# Patient Record
Sex: MALE | Race: OTHER | HISPANIC OR LATINO | URBAN - METROPOLITAN AREA
[De-identification: names, ages, dates, MRNs, and addresses within clinical notes are randomized per-mention and may not be internally consistent; named-entity substitution may affect disease eponyms.]

---

## 2020-07-08 ENCOUNTER — EMERGENCY (EMERGENCY)
Facility: HOSPITAL | Age: 50
LOS: 0 days | Discharge: HOME | End: 2020-07-08
Attending: EMERGENCY MEDICINE | Admitting: EMERGENCY MEDICINE
Payer: COMMERCIAL

## 2020-07-08 VITALS
OXYGEN SATURATION: 97 % | WEIGHT: 205.03 LBS | DIASTOLIC BLOOD PRESSURE: 86 MMHG | TEMPERATURE: 98 F | HEART RATE: 75 BPM | RESPIRATION RATE: 18 BRPM | SYSTOLIC BLOOD PRESSURE: 134 MMHG

## 2020-07-08 DIAGNOSIS — K08.89 OTHER SPECIFIED DISORDERS OF TEETH AND SUPPORTING STRUCTURES: ICD-10-CM

## 2020-07-08 DIAGNOSIS — Z88.1 ALLERGY STATUS TO OTHER ANTIBIOTIC AGENTS STATUS: ICD-10-CM

## 2020-07-08 DIAGNOSIS — K02.9 DENTAL CARIES, UNSPECIFIED: ICD-10-CM

## 2020-07-08 PROCEDURE — 99282 EMERGENCY DEPT VISIT SF MDM: CPT

## 2020-07-08 NOTE — CONSULT NOTE ADULT - SUBJECTIVE AND OBJECTIVE BOX
Patient is a 50y old  Male who presents with a chief complaint of  pain in upper right tooth.    HPI: Patient stated that he has been in pain for the past 3 days. He stated that he has hot and cold sensitivity and has not been able to eat well.      PAST MEDICAL & SURGICAL HISTORY:    (-   ) heart valve replacement  ( -  ) joint replacement    MEDICATIONS  (STANDING): none    MEDICATIONS  (PRN): patient instructed to take over the counter pain medication PRN as directed.    Allergies: Bactrim (Rash)      *SOCIAL HISTORY: ( -  ) Tobacco; (  - ) ETOH    Vital Signs Last 24 Hrs  T(C): 36.7 (08 Jul 2020 12:43), Max: 36.7 (08 Jul 2020 12:43)  T(F): 98 (08 Jul 2020 12:43), Max: 98 (08 Jul 2020 12:43)  HR: 75 (08 Jul 2020 12:43) (75 - 75)  BP: 134/86 (08 Jul 2020 12:43) (134/86 - 134/86)  BP(mean): --  RR: 18 (08 Jul 2020 12:43) (18 - 18)  SpO2: 97% (08 Jul 2020 12:43) (97% - 97%)    Last Dental Visit: unknown    EOE:  TMJ ( -  ) clicks                     (  - ) pops                     ( - ) crepitus             Mandible, Facial bones and MOM grossly intact          	   ( -  ) trismus             ( -  ) lymphadenopathy             ( -  ) swelling             ( -  ) asymmetry             (  - ) palpation             ( -  ) dyspnea             ( -  ) dysphagia             ( -  ) loss of consciousness    IOE:  permanent dentition: multiple carious teeth                hard/soft palate:  ( -  ) palatal torus, <<No pathology noted>>            tongue/FOM <<No pathology noted>>            labial/buccal mucosa <<No pathology noted>>           (  +  ) percussion           (  - ) palpation           (  - ) swelling            (  - ) abscess           (  - ) sinus tract    *DENTAL RADIOGRAPHS: 1 pre operative periapical taken of tooth #13 and 1 post operative periapical taken of tooth #13    *ASSESSMENT: Patient sensitive to percussion on tooth #13. Patient did not experience sensitivity to percussion on teeth #12 and #14 although these teeth do have carious lesions.     *PLAN: extraction of tooth #13, patient instructed to follow up with outside dentist to receive comprehensive care    PROCEDURE:   Verbal and written consent given.  Anesthesia: 2 carpules of 2% lidocaine with 1:100,000 epinephrine and 1 carpule of 4% septocaine with 1:100,000 epinephrine given via infiltration, palatal, and intrapulpal  Treatment: Soft tissue elevated from tooth #13 with periosteal elevator. Tooth mobilized with straight elevator. During delivery attempt with upper universal forcep, crown of tooth #13 fractured off. Tooth was sectioned mesial-distally. Flap was elevated from tooth #12 to tooth #14 using a 15 blade. Tooth was delivered using upper universal forcep.    RECOMMENDATIONS:  1) Visit outside dentist to receive comprehensive care and treat multiple carious teeth.  2) Dental F/U with outpatient dentist for comprehensive dental care.   3) If any difficulty swallowing/breathing, fever occur, return to ER.     Resident Name, pager # Patient is a 50y old  Male who presents with a chief complaint of  pain in upper right tooth.    HPI: Patient stated that he has been in pain for the past 3 days. He stated that he has hot and cold sensitivity and has not been able to eat well.      PAST MEDICAL & SURGICAL HISTORY:    (-   ) heart valve replacement  ( -  ) joint replacement    MEDICATIONS  (STANDING): none    MEDICATIONS  (PRN): patient instructed to take over the counter pain medication PRN as directed.    Allergies: Bactrim (Rash)      *SOCIAL HISTORY: ( -  ) Tobacco; (  - ) ETOH    Vital Signs Last 24 Hrs  T(C): 36.7 (08 Jul 2020 12:43), Max: 36.7 (08 Jul 2020 12:43)  T(F): 98 (08 Jul 2020 12:43), Max: 98 (08 Jul 2020 12:43)  HR: 75 (08 Jul 2020 12:43) (75 - 75)  BP: 134/86 (08 Jul 2020 12:43) (134/86 - 134/86)  BP(mean): --  RR: 18 (08 Jul 2020 12:43) (18 - 18)  SpO2: 97% (08 Jul 2020 12:43) (97% - 97%)    Last Dental Visit: unknown    EOE:  TMJ ( -  ) clicks                     (  - ) pops                     ( - ) crepitus             Mandible, Facial bones and MOM grossly intact          	   ( -  ) trismus             ( -  ) lymphadenopathy             ( -  ) swelling             ( -  ) asymmetry             (  - ) palpation             ( -  ) dyspnea             ( -  ) dysphagia             ( -  ) loss of consciousness    IOE:  permanent dentition: multiple carious teeth                hard/soft palate:  ( -  ) palatal torus, <<No pathology noted>>            tongue/FOM <<No pathology noted>>            labial/buccal mucosa <<No pathology noted>>           (  +  ) percussion           (  - ) palpation           (  - ) swelling            (  - ) abscess           (  - ) sinus tract    *DENTAL RADIOGRAPHS: 1 pre operative periapical taken of tooth #13 and 1 post operative periapical taken of tooth #13    *ASSESSMENT: Patient sensitive to percussion on tooth #13. Patient did not experience sensitivity to percussion on teeth #12 and #14 although these teeth do have carious lesions.     *PLAN: extraction of tooth #13, patient instructed to follow up with outside dentist to receive comprehensive care    PROCEDURE:   Verbal and written consent given.  Anesthesia: 2 carpules of 2% lidocaine with 1:100,000 epinephrine and 1 carpule of 4% septocaine with 1:100,000 epinephrine given via infiltration, palatal, and intrapulpal  Treatment: Soft tissue elevated from tooth #13 with periosteal elevator. Tooth mobilized with straight elevator. During delivery attempt with upper universal forcep, crown of tooth #13 fractured off. Tooth was sectioned mesial-distally. Flap was elevated from tooth #12 to tooth #14 using a 15 blade. Tooth was delivered using upper universal forcep. Two resorbable sutures placed in surgical site. Post-operative radiograph taken. Post-operative instructions given verbally and written. Hemostasis obtained. No complications.    RECOMMENDATIONS:  1) Visit outside dentist to receive comprehensive care and treat multiple carious teeth.  2) Dental F/U with outpatient dentist for comprehensive dental care.   3) If any difficulty swallowing/breathing, fever occur, return to ER.     Resident Name, pager #

## 2020-07-08 NOTE — ED PROVIDER NOTE - PHYSICAL EXAMINATION
CONST: Well appearing in NAD  EYES: PERRL, EOMI, Sclera and conjunctiva clear.   ENT: Oropharynx L upper dental carry. No abscess or swelling. Uvula midline. No nasal discharge.   NECK: Non-tender, no meningeal signs. normal ROM. supple   CARD: S1 S2; No jvd  RESP: Equal BS B/L, No wheezes, rhonchi or rales. No distress  GI: Soft, non-tender, non-distended. normal BS  MS: Normal ROM in all extremities. pulses 2 +. no calf tenderness or swelling  SKIN: Warm, dry, no acute rashes. Good turgor  NEURO: A&Ox3

## 2020-07-08 NOTE — ED PROVIDER NOTE - CLINICAL SUMMARY MEDICAL DECISION MAKING FREE TEXT BOX
I have fully discussed the medical management and delivery of care with the patient / guardian. I have discussed any available labs, imaging and treatment options with the patient / guardian and any diagnostic results supporting the patient's diagnosis. Please see progress notes, attending note and above notations for further mdm. Chart completed. I have discussed the medical management and delivery of care with the patient / guardian. I have discussed any available labs, imaging and treatment options with the patient / guardian and any diagnostic results supporting the patient's diagnosis. Please see progress notes, attending note and above notations for further mdm. Chart completed.

## 2020-07-08 NOTE — ED PROVIDER NOTE - OBJECTIVE STATEMENT
50y M no ppmh presents for eval of dental pain. Pt has 3 days of moderate sharp pain localized to L upper molar, minimal relief with oraljel, aggravated by eating. Denies fever, dc, redness, numbness

## 2020-07-08 NOTE — ED PROVIDER NOTE - NS ED ROS FT
Constitutional: (-) fever  Eyes/ENT: (+) dental pain, (-) blurry vision, (-) epistaxis  Cardiovascular: (-) chest pain, (-) syncope  Respiratory: (-) cough, (-) shortness of breath  Gastrointestinal: (-) vomiting, (-) diarrhea  : (-) dysuria, (-) hematuria  Musculoskeletal: (-) neck pain, (-) back pain, (-) joint pain  Integumentary: (-) rash, (-) edema  Neurological: (-) headache, (-) altered mental status  Allergic/Immunologic: (-) pruritus

## 2021-11-26 ENCOUNTER — EMERGENCY (EMERGENCY)
Facility: HOSPITAL | Age: 51
LOS: 0 days | Discharge: HOME | End: 2021-11-26
Attending: EMERGENCY MEDICINE | Admitting: EMERGENCY MEDICINE
Payer: COMMERCIAL

## 2021-11-26 VITALS
SYSTOLIC BLOOD PRESSURE: 143 MMHG | HEART RATE: 86 BPM | RESPIRATION RATE: 16 BRPM | OXYGEN SATURATION: 99 % | TEMPERATURE: 98 F | DIASTOLIC BLOOD PRESSURE: 86 MMHG | WEIGHT: 210.1 LBS

## 2021-11-26 DIAGNOSIS — R20.0 ANESTHESIA OF SKIN: ICD-10-CM

## 2021-11-26 DIAGNOSIS — Z88.1 ALLERGY STATUS TO OTHER ANTIBIOTIC AGENTS STATUS: ICD-10-CM

## 2021-11-26 DIAGNOSIS — G90.09 OTHER IDIOPATHIC PERIPHERAL AUTONOMIC NEUROPATHY: ICD-10-CM

## 2021-11-26 DIAGNOSIS — M79.644 PAIN IN RIGHT FINGER(S): ICD-10-CM

## 2021-11-26 DIAGNOSIS — M79.601 PAIN IN RIGHT ARM: ICD-10-CM

## 2021-11-26 DIAGNOSIS — M79.631 PAIN IN RIGHT FOREARM: ICD-10-CM

## 2021-11-26 PROCEDURE — 99283 EMERGENCY DEPT VISIT LOW MDM: CPT

## 2021-11-26 NOTE — ED PROVIDER NOTE - CARE PROVIDER_API CALL
Angelo Cardenas)  Orthopaedic Surgery  [address] [place] [address]  Phone: [phone]  Fax: [phone]  Follow Up Time: 1-3 Days

## 2021-11-26 NOTE — ED PROVIDER NOTE - ATTENDING CONTRIBUTION TO CARE
52 yo M no pmh presents with right arm pain. States that for the last week he has felt more pain and numbness feeling to the left forearm and left 4th and 5th digit. no fall or trauma. Works in construction.     CONSTITUTIONAL: Well-developed; well-nourished; in no acute distress.   SKIN: warm, dry. no ecchymosis no edema, no erythema.   EXT: Normal ROM.  5/5 strength in radial and median distribution, 4/5 strength in ulnar distrubution. Decreased sensation over ulnar distribution. full range of motion. 2+ pulses. no deformities. Mild tenderness over the right forearm on medial aspect.   NEURO: Alert, oriented, grossly unremarkable. neurovascularly intact

## 2021-11-26 NOTE — ED PROVIDER NOTE - PHYSICAL EXAMINATION
CONST: Well appearing in NAD  EYES: PERRL, EOMI, Sclera and conjunctiva clear.   NECK: Non-tender, no meningeal signs  CARD: Normal S1 S2; Normal rate and rhythm  RESP: Equal BS B/L, No wheezes, rhonchi or rales. No distress  GI: Soft, non-tender, non-distended.  MS: Normal ROM in all extremities. No midline spinal tenderness. Pulses intact of RUE  SKIN: Warm, dry, no acute rashes. Good turgor  NEURO: A&Ox3, Right hand with dec sensation and weakness of 5th finger (finger flare against resistance) in the ulnar nerve distribution. Percussion of the ulnar groove of the elbow elicited some pain along the ulnar aspect of the forearm. Normal hand . Median and radial nerves intact distally. No wrist drop

## 2021-11-26 NOTE — ED PROVIDER NOTE - OBJECTIVE STATEMENT
Pt with no PMH presents with 1 week of right hand 5th finger numbness and mild forearm discomfort. 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 Denies and wrist or elbow injury. Denies HA, leg weakness, numbness, slurred speech, visual changes. Pt states that for months he would wake in the night with numbness to the right hand but would then resolve.

## 2021-11-26 NOTE — ED ADULT TRIAGE NOTE - CHIEF COMPLAINT QUOTE
Pt c/o right forearm pain x2 weeks that radiates down to his hand, causing finger swelling. Pt also reports slight pain in left forearm that has just begun. Pt works in construction.

## 2021-11-26 NOTE — ED PROVIDER NOTE - PATIENT PORTAL LINK FT
You can access the FollowMyHealth Patient Portal offered by [facility] by registering at the following website: [email or web address]. By joining [facility]’s FollowMyHealth portal, you will also be able to view your health information using other applications (apps) compatible with our system.

## 2021-11-26 NOTE — ED ADULT NURSE NOTE - OBJECTIVE STATEMENT
50 y/o male Pt c/o right forearm pain x2 weeks that radiates down to his hand, causing finger swelling. Pt also reports slight pain in left forearm that has just begun. Pt works in construction.

## 2021-11-26 NOTE — ED PROVIDER NOTE - CLINICAL SUMMARY MEDICAL DECISION MAKING FREE TEXT BOX
Patient presents with right arm pain and numbness x 1 week. Found to have peripheral  neuropathy in ulnar distribution. Discharged with hand surgery follow up. Return precautions discussed.

## 2022-10-25 ENCOUNTER — APPOINTMENT (OUTPATIENT)
Dept: ORTHOPEDIC SURGERY | Facility: CLINIC | Age: 52
End: 2022-10-25

## 2023-06-26 ENCOUNTER — APPOINTMENT (OUTPATIENT)
Dept: ORTHOPEDIC SURGERY | Facility: CLINIC | Age: 53
End: 2023-06-26
Payer: COMMERCIAL

## 2023-06-26 PROCEDURE — 72110 X-RAY EXAM L-2 SPINE 4/>VWS: CPT

## 2023-06-26 PROCEDURE — 99203 OFFICE O/P NEW LOW 30 MIN: CPT

## 2023-06-26 NOTE — HISTORY OF PRESENT ILLNESS
[de-identified] : 53-year-old male presents with 5 years of low back pain.  Occasionally feels the pain in his feet.  When he walks his feet get numb and tingling.  Denies other claudicant type symptoms.  Denies radicular symptoms in his leg including pain through his buttocks or thighs.  The patient has never had any injections.  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  He denies any loss of bladder or bowel.

## 2023-06-26 NOTE — PHYSICAL EXAM
[de-identified] : TTP midline spine and paraspinal musculature \par Strength                                         \par Hip flexor\par   Right: 5/5; Left: 5/5                             \par Knee extensor  \par   Right: 5/5; Left: 5/5                     \par Ankle dorsiflexion\par   Right: 5/5; Left: 5/5                  \par EHL        \par   Right: 5/5; Left: 5/5                                \par Ankle plantarflexion    \par   Right: 5/5; Left: 5/5\par \par Sensation\par L1\par   Right: 2/2; Left: 2/2\par L2\par   Right: 2/2; Left: 2/2\par L3\par   Right: 2/2; Left: 2/2\par L4\par   Right: 2/2; Left: 2/2\par L5\par   Right: 2/2; Left: 2/2\par S1\par   Right: 2/2; Left: 2/2\par \par Reflexes\par Patella\par   Right: 2+; Left 2+\par Achilles\par   Right: 2+; Left 2+\par Clonus\par  Right: absent; L: absent\par

## 2023-06-26 NOTE — DISCUSSION/SUMMARY
[de-identified] : 53-year-old male with symptoms of spinal stenosis causing low back pain as well as paresthesias in his feet.  I am ordering an MRI of his lumbar spine and sending the patient to pain management to try epidural steroid injections.  I will see the patient back if he fails conservative care.

## 2023-06-26 NOTE — DATA REVIEWED
[FreeTextEntry1] : I obtained reviewed AP lateral flexion-extension lumbar x-ray.  The patient has some disc degeneration.  No instability.  Some facet arthritis.

## 2023-07-10 ENCOUNTER — APPOINTMENT (OUTPATIENT)
Dept: MRI IMAGING | Facility: CLINIC | Age: 53
End: 2023-07-10
Payer: COMMERCIAL

## 2023-07-10 PROCEDURE — 72148 MRI LUMBAR SPINE W/O DYE: CPT

## 2023-07-25 ENCOUNTER — APPOINTMENT (OUTPATIENT)
Dept: PAIN MANAGEMENT | Facility: CLINIC | Age: 53
End: 2023-07-25
Payer: COMMERCIAL

## 2023-07-25 VITALS — WEIGHT: 220 LBS | BODY MASS INDEX: 35.36 KG/M2 | HEIGHT: 66 IN

## 2023-07-25 DIAGNOSIS — R73.03 PREDIABETES.: ICD-10-CM

## 2023-07-25 DIAGNOSIS — Z86.59 PERSONAL HISTORY OF OTHER MENTAL AND BEHAVIORAL DISORDERS: ICD-10-CM

## 2023-07-25 PROCEDURE — 99204 OFFICE O/P NEW MOD 45 MIN: CPT

## 2023-07-27 RX ORDER — GEMFIBROZIL 600 MG/1
600 TABLET, FILM COATED ORAL
Refills: 0 | Status: ACTIVE | COMMUNITY

## 2023-07-27 RX ORDER — DICYCLOMINE HYDROCHLORIDE 20 MG/1
20 TABLET ORAL
Refills: 0 | Status: ACTIVE | COMMUNITY

## 2023-07-27 RX ORDER — BISACODYL 5 MG/1
5 TABLET, DELAYED RELEASE ORAL
Refills: 0 | Status: ACTIVE | COMMUNITY

## 2023-07-27 NOTE — PHYSICAL EXAM
[Normal Coordination] : normal coordination [Normal DTR UE/LE] : normal DTR UE/LE  [Normal Sensation] : normal sensation [Normal Mood and Affect] : normal mood and affect [Orientated] : orientated [Able to Communicate] : able to communicate [Well Developed] : well developed [Well Nourished] : well nourished [Flexion] : flexion [Diminished] : patella reflex diminished [] : negative sitting straight leg raise

## 2023-07-27 NOTE — ASSESSMENT
[FreeTextEntry1] : This is a 53 year old Telugu speaking male with complaints of lower back pain with radicular features into the right leg down to the calf with numbness in the feet. Imaging studies along with physical exam findings correlate symptomatology.  Patient defers physical therapy program states he does not have time to attend sessions. We will submit for a  RT L4-S1 TFESI w/ mac and follow up afterwards. All this patients questions were answered and the conversation was understood well.\par \par Patient had a MRI that shows a radicular component along with pain referred into the lower extremity. Patient has trialed rehab (home exercise, physical therapy or chiropractic care) and medications. I will schedule a Right L4-S1 TFESI.\par \par ANESTHESIA PARAGRAPH: The patient has severe anxiety of procedures that necessitates monitored anesthesia care (MAC). The procedure performed will be close to major nerves, arteries, and spinal cord and/or joint structures. Due to the proximity of these structures, we need the patient to be still during the procedure. With the help of MAC, this will be safely achieved and decrease the risk of any complications.\par \par RISK AND BENEFIT PARAGRAPH: Risk, benefits, pros and cons of procedure were explained to the patient using models and diagrams and their questions were answered.\par \par I, Lora Chaudhary, attest that this documentation has been prepared under the direction and in the presence of Provider Leny Aguero MD.\par \par \par Thank you for allowing me to assist in the management of this patient. \par \par \par Best Regards, \par \par \par Leny Aguero M.D., [facility]\par \par \par Diplomate, American Board of Physical Medicine and Rehabilitation\par Diplomate, American Board of Pain Medicine \par Diplomate, American Board of Pain Management\par \par

## 2023-07-27 NOTE — DATA REVIEWED
[FreeTextEntry1] : MRI of the lumbar spine dated 7/10/23 finds multilevel degenerative disc disease with annular bulges at L2-3, L4-5, and L5-S1 with a mild to moderate lumbar stenosis at L4-5.\par \par SOAPP: low risk 7/25/23\par Low risk: Patient has combination of a low risk SOAP and no risk factors. UDS would be repeated randomly every quarter.\par \par UDS: No data obtained today.\par \par [place] REGISTRY: Checked.

## 2023-07-27 NOTE — HISTORY OF PRESENT ILLNESS
[FreeTextEntry1] : This is a 53-year-old, Polish speaking, male here to establish care for lower back pain with radicular features into the right leg. He utilizes the tablet for translation. The pain travels into the calf and hurts when he walks. He reports numbness in his feet. He has not previously trialed physical therapy. He works in construction with concrete. He has not trialed any medications to treat his pain. MRI of the lumbar spine was reviewed with patient and is documented below. Conservative treatment vs injection therapy was discussed today. He would like to move forward injection therapy since he does not have time to attend physical therapy sessions. \par \par The patient has had this pain for 5 years worsening within the last 20 days. The pain started after no vent Patient describes pain as moderate. During the last month the pain has been worse during the night. Pain described as pressure-like. Pain is associated with numbness and pins and needles into the lower extremities. Patient has weakness in the upper and lower extremities. Pain is increased with lying down, standing, exercise. Pain is not changed with sitting, walking, relaxation, coughing/sneezing, bowel movements. Bowel or bladder habits have not changed.\par \par ACTIVITIES: Patient could walk a few blocks before the pain starts.  Patient has no pain while sitting  Patient uses no assistive walking device at this time. Patient has difficulty going to work at this time.\par \par PRIOR PAIN TREATMENTS:  No prior pain treatments.\par \par PRIOR PAIN MEDICATIONS:  No prior pain medications.\par

## 2023-08-04 ENCOUNTER — APPOINTMENT (OUTPATIENT)
Dept: PAIN MANAGEMENT | Facility: CLINIC | Age: 53
End: 2023-08-04
Payer: COMMERCIAL

## 2023-08-04 PROCEDURE — 94761 N-INVAS EAR/PLS OXIMETRY MLT: CPT | Mod: 59

## 2023-08-04 PROCEDURE — 64483 NJX AA&/STRD TFRM EPI L/S 1: CPT | Mod: RT,59

## 2023-08-04 PROCEDURE — 93040 RHYTHM ECG WITH REPORT: CPT | Mod: 79

## 2023-08-04 PROCEDURE — 00630 ANES PX LUMBAR REGION NOS: CPT | Mod: QZ,P2

## 2023-08-04 PROCEDURE — 64484 NJX AA&/STRD TFRM EPI L/S EA: CPT | Mod: RT,59

## 2023-08-04 PROCEDURE — 93770 DETERMINATION VENOUS PRESS: CPT | Mod: 59

## 2023-08-04 NOTE — PROCEDURE
[FreeTextEntry1] : SELECTIVE TRANSFORAMINAL LUMBAR EPIDURAL NERVE ROOT INJECTION [FreeTextEntry3] : Date:  2023  Patient: Jarad Sanches  :  1970   Preoperative Diagnosis: Right L5 radiculitis; Right L4 radiculitis  Procedure: Selective Right L4-5; L5-S1 Transforaminal Lumbar Epidural Nerve Root Injection under Fluoroscopy  Phys[facility]    Anesthesiologist/CRNA: Ms. Gotti  Anesthesia: See nurses notes/MAC/Cold spray, Versed 2mg, Fentanyl 150 mcg  Medical Necessity:  Failure of conservative management.  Indication for Fluoroscopy:  This procedure requires the precise placement of the spinal needle into the specific paravertebral foramen.  It is the only way to accurately and safely perform the injection.    CONSENT: The possible complications including infection, bleeding, nerve damage, Hospital admission, death or failure of the procedure; though unusual, are theoretically possible. The patient was educated about the of the procedure and alternative therapies. All questions were answered and the patient freely gave consent to proceed.    Monitoring:  Patient had continuous blood pressure, EKG, and pulse oximetry throughout the case. See nurse's notes     PROCEDURE NOTE:  After obtaining written consent, the patient was placed on the fluoroscopic table in the prone position with the pillow placed under the hips. The lumbar area was prepped with betadine solution and draped in the usual manner. A time out was performed.  Cold spray was used to localize the area. The fluoroscope was used to identify the L3///L4///L5 vertebral body on the AP projection. It was then rotated into an oblique projection until the superior articulating process of the L5 (inferior) vertebra is projected beneath the 6 o'clock position of the L4 (superior) vertebrae. The 22 gauge 3.5inch needle was inserted in the skin at a point overlying the superior articulating process of the inferior vertebra and aimed for the 6 o'clock position of the superior vertebrae's pedicle.  After the needle contacted bone, a lateral projection was obtained to insure that the needle tip was in proximity with the vertebral body. Paresthesia's were not noted.  One ml of Omnipaque 240 was injected and a neurogram was obtained. Following demonstration of the neurogram, 1 ml of Preservative free normal saline and 1 ml of depomedrol (40mg) was injected. The small volume and relatively high concentration was chosen to preserve selectivity and diagnostic value of the injection. There was no CSF or heme identified.  The L5-S1 level was injected in identical fashion. There were no signs of, intravascular block or hypotension. The needle was removed intact. A band aid was place on the site.     Epidurogram: The nerve root was observed in its outline on the neurogram. Distal and proximal spread was noted pointing away from epidural fibrosis/scarring.   Complications: None. The patient tolerated the procedure well.      Disposition: I have examined the patient and there are no new physical findings since the original presentation.  Sensory and motor function were intact. The patient met discharge criteria see nurses notes. The discharge instruction sheet was reviewed and given to the patient. The patient was discharged home with a . If patient gets sustained relief will have patient do modified planks 3 times a day on carpet or yoga mat starting at 5 seconds building up to 1 minute without grimacing/Valsalva and walking.      Comment: 1st SNRI today, depending on effectiveness would schedule 2nd SNRI in 1-2 weeks vs caudal epidural steroid vs follow up in office depending on insurances. Follow up office. Call if any problems     This document was electronically signed by:  Leny Aguero MD, FAAPMR Diplomate, American Board of Physical Medicine and Rehabilitation Diplomate, American Board of Pain Medicine

## 2023-08-31 ENCOUNTER — APPOINTMENT (OUTPATIENT)
Dept: PAIN MANAGEMENT | Facility: CLINIC | Age: 53
End: 2023-08-31
Payer: COMMERCIAL

## 2023-08-31 DIAGNOSIS — M54.50 LOW BACK PAIN, UNSPECIFIED: ICD-10-CM

## 2023-08-31 PROCEDURE — 99213 OFFICE O/P EST LOW 20 MIN: CPT

## 2023-08-31 NOTE — ASSESSMENT
[FreeTextEntry1] : This is a 53 year old Taiwanese speaking male with complaints of lower back pain with improving lumbar pain. He is s/p a Right L4-L5, L5-S1 SNRI on 8/04/23 with at least 60% sustained relief. He notes residual pain into the right lower extremity. He wishes to trial PT as he is able to attend on weekends. If no relief with PT, we will submit for a RT L4-S1 TFESI w/ mac on a future visit. Patient will f/u in 6 weeks for further evaluation. All this patients questions were answered and the conversation was understood well.  GENESIS Martinez MD

## 2023-08-31 NOTE — ASSESSMENT
[FreeTextEntry1] : This is a 53 year old Burkinan speaking male with complaints of lower back pain with improving lumbar pain. He is s/p a Right L4-L5, L5-S1 SNRI on 8/04/23 with at least 60% sustained relief. He notes residual pain into the right lower extremity. He wishes to trial PT as he is able to attend on weekends. If no relief with PT, we will submit for a RT L4-S1 TFESI w/ mac on a future visit. Patient will f/u in 6 weeks for further evaluation. All this patients questions were answered and the conversation was understood well.  GENESIS Martinez MD

## 2023-08-31 NOTE — HISTORY OF PRESENT ILLNESS
[FreeTextEntry1] : This is a 53-year-old, [place]riot speaking, male here to establish care for lower back pain with radicular features into the right leg. He utilizes the tablet for translation. The pain travels into the calf and hurts when he walks. He reports numbness in his feet. He has not previously trialed physical therapy. He works in construction with concrete. He has not trialed any medications to treat his pain. MRI of the lumbar spine was reviewed with patient and is documented below. Conservative treatment vs injection therapy was discussed today. He would like to move forward injection therapy since he does not have time to attend physical therapy sessions.   The patient has had this pain for 5 years worsening within the last 20 days. The pain started after no vent Patient describes pain as moderate. During the last month the pain has been worse during the night. Pain described as pressure-like. Pain is associated with numbness and pins and needles into the lower extremities. Patient has weakness in the upper and lower extremities. Pain is increased with lying down, standing, exercise. Pain is not changed with sitting, walking, relaxation, coughing/sneezing, bowel movements. Bowel or bladder habits have not changed.  ACTIVITIES: Patient could walk a few blocks before the pain starts.  Patient has no pain while sitting Patient uses no assistive walking device at this time. Patient has difficulty going to work at this time.  PRIOR PAIN TREATMENTS:  No prior pain treatments.  PRIOR PAIN MEDICATIONS:  No prior pain medications.  Today: I had the pleasure of seeing DARCY EUGENE in the office today. Previous history and physical exam findings have been reviewed. He is currently under our care for lumbar pain with radiculopathy which he is receiving active care for. He is s/p a Right L4-L5, L5-S1 SNRI on 8/04/23 with at least 60% sustained relief. He notes residual pain into the right lower extremity. The pain travels into the calf and hurts when he walks. He reports numbness in his feet. He wishes to hold off with injection therapy and wishes to trial PT.

## 2023-08-31 NOTE — PHYSICAL EXAM
[Normal Coordination] : normal coordination [Normal DTR UE/LE] : normal DTR UE/LE  [Normal Sensation] : normal sensation [Normal Mood and Affect] : normal mood and affect [Orientated] : orientated [Able to Communicate] : able to communicate [Well Developed] : well developed [Well Nourished] : well nourished [Flexion] : flexion [Diminished] : patella reflex diminished [] : patient ambulates without assistive device

## 2023-08-31 NOTE — HISTORY OF PRESENT ILLNESS
[FreeTextEntry1] : This is a 53-year-old, [place]aur[place] speaking, male here to establish care for lower back pain with radicular features into the right leg. He utilizes the tablet for translation. The pain travels into the calf and hurts when he walks. He reports numbness in his feet. He has not previously trialed physical therapy. He works in construction with concrete. He has not trialed any medications to treat his pain. MRI of the lumbar spine was reviewed with patient and is documented below. Conservative treatment vs injection therapy was discussed today. He would like to move forward injection therapy since he does not have time to attend physical therapy sessions.   The patient has had this pain for 5 years worsening within the last 20 days. The pain started after no vent Patient describes pain as moderate. During the last month the pain has been worse during the night. Pain described as pressure-like. Pain is associated with numbness and pins and needles into the lower extremities. Patient has weakness in the upper and lower extremities. Pain is increased with lying down, standing, exercise. Pain is not changed with sitting, walking, relaxation, coughing/sneezing, bowel movements. Bowel or bladder habits have not changed.  ACTIVITIES: Patient could walk a few blocks before the pain starts.  Patient has no pain while sitting Patient uses no assistive walking device at this time. Patient has difficulty going to work at this time.  PRIOR PAIN TREATMENTS:  No prior pain treatments.  PRIOR PAIN MEDICATIONS:  No prior pain medications.  Today: I had the pleasure of seeing DARCY EUGENE in the office today. Previous history and physical exam findings have been reviewed. He is currently under our care for lumbar pain with radiculopathy which he is receiving active care for. He is s/p a Right L4-L5, L5-S1 SNRI on 8/04/23 with at least 60% sustained relief. He notes residual pain into the right lower extremity. The pain travels into the calf and hurts when he walks. He reports numbness in his feet. He wishes to hold off with injection therapy and wishes to trial PT.

## 2023-10-12 ENCOUNTER — APPOINTMENT (OUTPATIENT)
Dept: PAIN MANAGEMENT | Facility: CLINIC | Age: 53
End: 2023-10-12
Payer: COMMERCIAL

## 2023-10-12 VITALS — WEIGHT: 220 LBS | HEIGHT: 66 IN | BODY MASS INDEX: 35.36 KG/M2

## 2023-10-12 DIAGNOSIS — M54.16 RADICULOPATHY, LUMBAR REGION: ICD-10-CM

## 2023-10-12 DIAGNOSIS — Z00.00 ENCOUNTER FOR GENERAL ADULT MEDICAL EXAMINATION W/OUT ABNORMAL FINDINGS: ICD-10-CM

## 2023-10-12 DIAGNOSIS — M48.061 SPINAL STENOSIS, LUMBAR REGION WITHOUT NEUROGENIC CLAUDICATION: ICD-10-CM

## 2023-10-12 PROCEDURE — 99214 OFFICE O/P EST MOD 30 MIN: CPT

## 2023-11-03 ENCOUNTER — APPOINTMENT (OUTPATIENT)
Dept: PAIN MANAGEMENT | Facility: CLINIC | Age: 53
End: 2023-11-03

## 2025-03-21 ENCOUNTER — EMERGENCY (EMERGENCY)
Facility: HOSPITAL | Age: 55
LOS: 0 days | Discharge: HOME | End: 2025-03-21
Attending: EMERGENCY MEDICINE
Payer: COMMERCIAL

## 2025-03-21 VITALS
WEIGHT: 223.99 LBS | SYSTOLIC BLOOD PRESSURE: 118 MMHG | HEART RATE: 79 BPM | RESPIRATION RATE: 18 BRPM | TEMPERATURE: 98 F | DIASTOLIC BLOOD PRESSURE: 67 MMHG | HEIGHT: 66 IN | OXYGEN SATURATION: 97 %

## 2025-03-21 DIAGNOSIS — Z88.1 ALLERGY STATUS TO OTHER ANTIBIOTIC AGENTS: ICD-10-CM

## 2025-03-21 DIAGNOSIS — M79.605 PAIN IN LEFT LEG: ICD-10-CM

## 2025-03-21 DIAGNOSIS — M79.89 OTHER SPECIFIED SOFT TISSUE DISORDERS: ICD-10-CM

## 2025-03-21 DIAGNOSIS — M79.662 PAIN IN LEFT LOWER LEG: ICD-10-CM

## 2025-03-21 PROCEDURE — 99284 EMERGENCY DEPT VISIT MOD MDM: CPT | Mod: 25

## 2025-03-21 PROCEDURE — 93970 EXTREMITY STUDY: CPT

## 2025-03-21 PROCEDURE — 93970 EXTREMITY STUDY: CPT | Mod: 26

## 2025-03-21 PROCEDURE — 99284 EMERGENCY DEPT VISIT MOD MDM: CPT
